# Patient Record
Sex: FEMALE | Race: BLACK OR AFRICAN AMERICAN | NOT HISPANIC OR LATINO
[De-identification: names, ages, dates, MRNs, and addresses within clinical notes are randomized per-mention and may not be internally consistent; named-entity substitution may affect disease eponyms.]

---

## 2023-10-02 ENCOUNTER — DASHBOARD ENCOUNTERS (OUTPATIENT)
Age: 58
End: 2023-10-02

## 2023-10-02 ENCOUNTER — OFFICE VISIT (OUTPATIENT)
Dept: URBAN - METROPOLITAN AREA CLINIC 84 | Facility: CLINIC | Age: 58
End: 2023-10-02
Payer: COMMERCIAL

## 2023-10-02 VITALS
HEART RATE: 83 BPM | WEIGHT: 224.4 LBS | DIASTOLIC BLOOD PRESSURE: 104 MMHG | TEMPERATURE: 97.2 F | SYSTOLIC BLOOD PRESSURE: 167 MMHG | BODY MASS INDEX: 41.3 KG/M2 | HEIGHT: 62 IN

## 2023-10-02 DIAGNOSIS — Z86.010 HISTORY OF COLON POLYPS: ICD-10-CM

## 2023-10-02 PROBLEM — 428283002: Status: ACTIVE | Noted: 2023-10-02

## 2023-10-02 PROCEDURE — 99203 OFFICE O/P NEW LOW 30 MIN: CPT

## 2023-10-02 RX ORDER — AMLODIPINE BESYLATE 5 MG/1
TAKE 1 TABLET BY MOUTH EVERY DAY TABLET ORAL
Qty: 90 EACH | Refills: 1 | Status: ACTIVE | COMMUNITY

## 2023-10-02 RX ORDER — OLMESARTAN MEDOXOMIL 40 MG/1
TABLET ORAL
Qty: 90 TABLET | Status: ACTIVE | COMMUNITY

## 2023-10-02 RX ORDER — OLMESARTAN MEDOXOMIL 20 MG/1
TABLET, FILM COATED ORAL
Qty: 0 | Refills: 0 | Status: DISCONTINUED | COMMUNITY
Start: 2016-08-20

## 2023-10-02 RX ORDER — HYDROCHLOROTHIAZIDE 25 MG/1
TABLET ORAL
Qty: 0 | Refills: 0 | Status: DISCONTINUED | COMMUNITY
Start: 2016-08-30

## 2023-10-02 NOTE — HPI-TODAY'S VISIT:
Ms. Washington is a 57y F, she presents to the clinic for colon cancer screening  No change in bowel habits, good bowel habitus 2-3 BM's a day, no hard stools or straining  Intentional change in weight, 30lb weight loss  No change in appetite No n/v No BRBPR, no Melena No abdominal pain No SOB, no CP Last colonoscopy: 2016  No known family h/o colon cancer/polyps. Denies cardiac hardware, dialysis, blood thinner use, and or issues with anesthesia    Colon 2016  - The examined portion of the ileum was normal. - Melanosis in the colon. - One 3 mm polyp in the sigmoid colon, removed with a cold biopsy forceps. Resected and retrieved. - One 4 mm polyp in the rectum, removed with a cold snare. Resected and retrieved.

## 2023-10-02 NOTE — PHYSICAL EXAM CHEST:
breathing is unlabored without accessory muscle use, normal breath sounds , breathing is unlabored without accessory muscle use, normal breath sounds

## 2023-10-02 NOTE — PHYSICAL EXAM CONSTITUTIONAL:
alert , in no acute distress , well developed, well nourished , alert , in no acute distress , well developed, well nourished

## 2023-10-02 NOTE — PHYSICAL EXAM LUNGS:
clear to auscultation bilaterally , no wheezes, rales, rhonchi , clear to auscultation bilaterally , no wheezes, rales, rhonchi

## 2023-10-02 NOTE — PHYSICAL EXAM CARDIOVASCULAR:
Regular rate and rhythm, no murmurs, gallops, or rubs , Regular rate and rhythm, no murmurs, gallops, or rubs

## 2023-10-02 NOTE — PHYSICAL EXAM NECK/THYROID:
normal appearance, trachea midline, no deformities , normal appearance, trachea midline, no deformities

## 2023-10-02 NOTE — PHYSICAL EXAM GASTROINTESTINAL
Abdomen, soft, non-tender, non distended , no masses palpable, normal bowel sounds, no hepatomegaly present,  Rectal deferred  , Abdomen, soft, non-tender, non distended , no masses palpable, normal bowel sounds, no hepatomegaly present,  Rectal deferred

## 2023-10-20 ENCOUNTER — OUT OF OFFICE VISIT (OUTPATIENT)
Dept: URBAN - METROPOLITAN AREA SURGERY CENTER 20 | Facility: SURGERY CENTER | Age: 58
End: 2023-10-20
Payer: COMMERCIAL

## 2023-10-20 ENCOUNTER — CLAIMS CREATED FROM THE CLAIM WINDOW (OUTPATIENT)
Dept: URBAN - METROPOLITAN AREA CLINIC 4 | Facility: CLINIC | Age: 58
End: 2023-10-20
Payer: COMMERCIAL

## 2023-10-20 DIAGNOSIS — K64.8 INTERNAL HEMORRHOID: ICD-10-CM

## 2023-10-20 DIAGNOSIS — K63.89 MELANOSIS OF COLON: ICD-10-CM

## 2023-10-20 DIAGNOSIS — D12.8 ADENOMATOUS POLYP OF RECTUM: ICD-10-CM

## 2023-10-20 DIAGNOSIS — Z86.010 ADENOMAS PERSONAL HISTORY OF COLONIC POLYPS: ICD-10-CM

## 2023-10-20 DIAGNOSIS — Z12.11 COLON CANCER SCREENING: ICD-10-CM

## 2023-10-20 DIAGNOSIS — D12.8 BENIGN NEOPLASM OF RECTUM: ICD-10-CM

## 2023-10-20 PROCEDURE — 00811 ANES LWR INTST NDSC NOS: CPT | Performed by: ANESTHESIOLOGY

## 2023-10-20 PROCEDURE — G8907 PT DOC NO EVENTS ON DISCHARG: HCPCS | Performed by: INTERNAL MEDICINE

## 2023-10-20 PROCEDURE — 45385 COLONOSCOPY W/LESION REMOVAL: CPT | Performed by: INTERNAL MEDICINE

## 2023-10-20 PROCEDURE — 88305 TISSUE EXAM BY PATHOLOGIST: CPT | Performed by: PATHOLOGY

## 2023-10-20 RX ORDER — OLMESARTAN MEDOXOMIL 40 MG/1
TABLET ORAL
Qty: 90 TABLET | Status: ACTIVE | COMMUNITY

## 2023-10-20 RX ORDER — AMLODIPINE BESYLATE 5 MG/1
TAKE 1 TABLET BY MOUTH EVERY DAY TABLET ORAL
Qty: 90 EACH | Refills: 1 | Status: ACTIVE | COMMUNITY